# Patient Record
Sex: MALE | Race: WHITE
[De-identification: names, ages, dates, MRNs, and addresses within clinical notes are randomized per-mention and may not be internally consistent; named-entity substitution may affect disease eponyms.]

---

## 2017-05-19 ENCOUNTER — HOSPITAL ENCOUNTER (EMERGENCY)
Dept: HOSPITAL 75 - ER | Age: 27
Discharge: HOME | End: 2017-05-19
Payer: SELF-PAY

## 2017-05-19 VITALS — WEIGHT: 225 LBS | BODY MASS INDEX: 34.1 KG/M2 | HEIGHT: 68 IN

## 2017-05-19 VITALS — DIASTOLIC BLOOD PRESSURE: 78 MMHG | SYSTOLIC BLOOD PRESSURE: 111 MMHG

## 2017-05-19 DIAGNOSIS — F31.9: ICD-10-CM

## 2017-05-19 DIAGNOSIS — F19.10: ICD-10-CM

## 2017-05-19 DIAGNOSIS — Z79.899: ICD-10-CM

## 2017-05-19 DIAGNOSIS — R74.8: Primary | ICD-10-CM

## 2017-05-19 DIAGNOSIS — F20.9: ICD-10-CM

## 2017-05-19 DIAGNOSIS — Z59.0: ICD-10-CM

## 2017-05-19 DIAGNOSIS — Z91.19: ICD-10-CM

## 2017-05-19 LAB
ALBUMIN SERPL-MCNC: 4.3 G/DL (ref 3.2–4.5)
ALT SERPL-CCNC: 293 U/L (ref 0–55)
ANION GAP SERPL CALC-SCNC: 12 MMOL/L (ref 5–14)
APAP SERPL-MCNC: < 10 UG/ML (ref 10–30)
AST SERPL-CCNC: 122 U/L (ref 5–34)
BASOPHILS # BLD AUTO: 0 10^3/UL (ref 0–0.1)
BASOPHILS NFR BLD AUTO: 0 % (ref 0–10)
BILIRUB SERPL-MCNC: 1.3 MG/DL (ref 0.1–1)
BILIRUB UR QL STRIP: NEGATIVE
BUN SERPL-MCNC: 14 MG/DL (ref 7–18)
BUN/CREAT SERPL: 17
CALCIUM SERPL-MCNC: 9.5 MG/DL (ref 8.5–10.1)
CHLORIDE SERPL-SCNC: 108 MMOL/L (ref 98–107)
CO2 SERPL-SCNC: 22 MMOL/L (ref 21–32)
CREAT SERPL-MCNC: 0.84 MG/DL (ref 0.6–1.3)
EOSINOPHIL # BLD AUTO: 0 10^3/UL (ref 0–0.3)
EOSINOPHIL NFR BLD AUTO: 1 % (ref 0–10)
ERYTHROCYTE [DISTWIDTH] IN BLOOD BY AUTOMATED COUNT: 13 % (ref 10–14.5)
ETHANOL SERPL-MCNC: 38 MG/DL (ref ?–10)
GFR SERPLBLD BASED ON 1.73 SQ M-ARVRAT: > 60 ML/MIN
GLUCOSE SERPL-MCNC: 99 MG/DL (ref 70–105)
KETONES UR QL STRIP: (no result)
LEUKOCYTE ESTERASE UR QL STRIP: NEGATIVE
LYMPHOCYTES # BLD AUTO: 1.3 X 10^3 (ref 1–4)
LYMPHOCYTES NFR BLD AUTO: 18 % (ref 12–44)
MCH RBC QN AUTO: 31 PG (ref 25–34)
MCHC RBC AUTO-ENTMCNC: 35 G/DL (ref 32–36)
MCV RBC AUTO: 88 FL (ref 80–99)
MONOCYTES # BLD AUTO: 0.9 X 10^3 (ref 0–1)
MONOCYTES NFR BLD AUTO: 12 % (ref 0–12)
NEUTROPHILS # BLD AUTO: 5.2 X 10^3 (ref 1.8–7.8)
NEUTROPHILS NFR BLD AUTO: 70 % (ref 42–75)
NITRITE UR QL STRIP: NEGATIVE
PH UR STRIP: 5 [PH] (ref 5–9)
PLATELET # BLD: 202 10^3/UL (ref 130–400)
PMV BLD AUTO: 8.9 FL (ref 7.4–10.4)
POTASSIUM SERPL-SCNC: 3.5 MMOL/L (ref 3.6–5)
PROT SERPL-MCNC: 6.5 G/DL (ref 6.4–8.2)
PROT UR QL STRIP: (no result)
RBC # BLD AUTO: 4.51 10^6/UL (ref 4.35–5.85)
SALICYLATES SERPL-MCNC: < 5 MG/DL (ref 5–20)
SODIUM SERPL-SCNC: 142 MMOL/L (ref 135–145)
SP GR UR STRIP: 1.02 (ref 1.02–1.02)
UROBILINOGEN UR-MCNC: 4 MG/DL
WBC # BLD AUTO: 7.5 10^3/UL (ref 4.3–11)
WBC #/AREA URNS HPF: (no result) /HPF

## 2017-05-19 PROCEDURE — 81000 URINALYSIS NONAUTO W/SCOPE: CPT

## 2017-05-19 PROCEDURE — 80053 COMPREHEN METABOLIC PANEL: CPT

## 2017-05-19 PROCEDURE — 93005 ELECTROCARDIOGRAM TRACING: CPT

## 2017-05-19 PROCEDURE — 70450 CT HEAD/BRAIN W/O DYE: CPT

## 2017-05-19 PROCEDURE — 36415 COLL VENOUS BLD VENIPUNCTURE: CPT

## 2017-05-19 PROCEDURE — 80329 ANALGESICS NON-OPIOID 1 OR 2: CPT

## 2017-05-19 PROCEDURE — 85025 COMPLETE CBC W/AUTO DIFF WBC: CPT

## 2017-05-19 PROCEDURE — 72125 CT NECK SPINE W/O DYE: CPT

## 2017-05-19 PROCEDURE — 80306 DRUG TEST PRSMV INSTRMNT: CPT

## 2017-05-19 PROCEDURE — 80320 DRUG SCREEN QUANTALCOHOLS: CPT

## 2017-05-19 PROCEDURE — 70486 CT MAXILLOFACIAL W/O DYE: CPT

## 2017-05-19 SDOH — ECONOMIC STABILITY - HOUSING INSECURITY: HOMELESSNESS: Z59.0

## 2017-05-19 NOTE — ED GENERAL
General


Stated Complaint:  ETOH


Source of Information:  EMS


Exam Limitations:  No Limitations





History of Present Illness


Time Seen by Provider:  21:25


Initial Comments


To ER per EMS after [place] police called them as the patient was found 

meandering down the [facility] streets of [place] wearing only his underwear and 

with a bloody nose.  Patient reported that he been drinking 4 beers tonight and 

several shots of vodka and was looking for night crawlers in the rain when he 

tripped and fell striking his nose.  He also reports that he was recently 

[facility] where he was held for several days 

for drug  charges.  Patient states that tonight he was walking down the street 

because he has a "broken heart because he has So much love that he wants to 

show but doesn't know how to show it" and he was also looking for night collars 

in the rainsso that he  could go fishing.  He denies any thoughts of self-harm.

  He states that he's been on several psychiatric medications but stopped them 

yesterday when he was released from nursing home.  Reports a history of various mental 

illnesses such as PTSD, paranoid schizophrenia, bipolar disorder.  His 

medications included omeprazole, Effexor, Zyprexa, several others that he 

states totaled 7 medications.  He also states that he is homeless and has 

nowhere to go.


Timing/Duration:  Other


Severity:  Moderate (unknown)


Associated Systoms:  Denies Symptoms





Allergies and Home Medications


Allergies


Coded Allergies:  


     No Known Drug Allergies (Unverified , 5/19/17)





Home Medications


Divalproex Sodium 250 Mg Tablet.dr, 250 MG PO BID, #60 Ref 0


   Prescribed by: MADELYN FUENTES on 9/25/15 1236





Constitutional:  see HPI


EENTM:  see HPI


Respiratory:  no symptoms reported


Genitourinary:  no symptoms reported


Musculoskeletal:  no symptoms reported


Skin:  no symptoms reported


Psychiatric/Neurological:  No Symptoms Reported


Hematologic/Lymphatic:  No Symptoms Reported


Immunological/Allergic:  no symptoms reported





Past Medical-Social-Family Hx


Patient Social History


Type Used:  Cigarettes


Recent Hopitalizations:  No





Seasonal Allergies


Seasonal Allergies:  No





Surgeries


HX Surgeries:  Yes


Surgeries:  Adenoidectomy, Tonsillectomy





Respiratory


Hx Respiratory Disorders:  Yes


Respiratory Disorders:  Asthma, COPD





Cardiovascular


Hx Cardiac Disorders:  Yes


Cardiac Disorders:  Heart Murmur





Neurological


Hx Neurological Disorders:  Yes


Neurological Disorders:  Seizure Disorder





Genitourinary


Hx Genitourinary Disorders:  No





Gastrointestinal


Hx Gastrointestinal Disorders:  No





Musculoskeletal


Hx Musculoskeletal Disorders:  No





Endocrine


Hx Endocrine Disorders:  No





HEENT


HX ENT Disorders:  No





Cancer


Hx Cancer:  No





Psychosocial


Hx Psychiatric Problems:  Yes


Behavioral Health Disorders:  Anxiety, Bipolar, Schizophrenia, Depression





Integumentary


HX Skin/Integumentary Disorder:  Yes


Skin/Integumentary Disorders:  Recent Skin Changes





Blood Transfusions


Hx Blood Disorders:  No


Adverse Reaction to a Blood Tr:  No





Family Medical History


Significant Family History:  No Pertinent Family Hx





Physical Exam


Vital Signs





Vital Sign - Last 12Hours








 5/19/17





 21:42


 


Temp 97.6


 


Pulse 86


 


Resp 14


 


B/P (MAP) 126/82


 


Pulse Ox 98


 


O2 Delivery Room Air





Capillary Refill :


General Appearance:  No Apparent Distress, WD/WN


Eyes:  Bilateral Eye EOMI, Bilateral Eye Normal Inspection, Bilateral Eye PERRL


HEENT:  PERRL/EOMI, TMs Normal, Other (edentulous, dried blood in nare, no 

septal hematoma or active bleeding. )


Neck:  Full Range of Motion, Normal Inspection


Respiratory:  Normal Breath Sounds, No Accessory Muscle Use, No Respiratory 

Distress


Cardiovascular:  Regular Rate, Rhythm, Normal Peripheral Pulses


Gastrointestinal:  Non Tender, Soft


Extremity:  Normal Capillary Refill, Normal Inspection


Neurologic/Psychiatric:  Alert, Other (cooperative, somber, answers questions 

appropriately.)


Skin:  Normal Color, Warm/Dry





Progress/Results/Core Measures


Results/Orders


Lab Results





Laboratory Tests








Test


  5/19/17


21:28 5/19/17


21:40 Range/Units


 


 


White Blood Count


  7.5 


  


  4.3-11.0


10^3/uL


 


Red Blood Count


  4.51 


  


  4.35-5.85


10^6/uL


 


Hemoglobin 13.9   13.3-17.7  G/DL


 


Hematocrit 40   40-54  %


 


Mean Corpuscular Volume 88   80-99  FL


 


Mean Corpuscular Hemoglobin 31   25-34  PG


 


Mean Corpuscular Hemoglobin


Concent 35 


  


  32-36  G/DL


 


 


Red Cell Distribution Width 13.0   10.0-14.5  %


 


Platelet Count


  202 


  


  130-400


10^3/uL


 


Mean Platelet Volume 8.9   7.4-10.4  FL


 


Neutrophils (%) (Auto) 70   42-75  %


 


Lymphocytes (%) (Auto) 18   12-44  %


 


Monocytes (%) (Auto) 12   0-12  %


 


Eosinophils (%) (Auto) 1   0-10  %


 


Basophils (%) (Auto) 0   0-10  %


 


Neutrophils # (Auto) 5.2   1.8-7.8  X 10^3


 


Lymphocytes # (Auto) 1.3   1.0-4.0  X 10^3


 


Monocytes # (Auto) 0.9   0.0-1.0  X 10^3


 


Eosinophils # (Auto)


  0.0 


  


  0.0-0.3


10^3/uL


 


Basophils # (Auto)


  0.0 


  


  0.0-0.1


10^3/uL


 


Sodium Level 142   135-145  MMOL/L


 


Potassium Level 3.5 L  3.6-5.0  MMOL/L


 


Chloride Level 108 H    MMOL/L


 


Carbon Dioxide Level 22   21-32  MMOL/L


 


Anion Gap 12   5-14  MMOL/L


 


Blood Urea Nitrogen 14   7-18  MG/DL


 


Creatinine


  0.84 


  


  0.60-1.30


MG/DL


 


Estimat Glomerular Filtration


Rate > 60 


  


   


 


 


BUN/Creatinine Ratio 17    


 


Glucose Level 99     MG/DL


 


Calcium Level 9.5   8.5-10.1  MG/DL


 


Total Bilirubin 1.3 H  0.1-1.0  MG/DL


 


Aspartate Amino Transf


(AST/SGOT) 122 H


  


  5-34  U/L


 


 


Alanine Aminotransferase


(ALT/SGPT) 293 H


  


  0-55  U/L


 


 


Alkaline Phosphatase 87     U/L


 


Total Protein 6.5   6.4-8.2  G/DL


 


Albumin 4.3   3.2-4.5  G/DL


 


Salicylates Level < 5.0 L  5.0-20.0  MG/DL


 


Acetaminophen Level < 10 L  10-30  UG/ML


 


Serum Alcohol 38 H  <10  MG/DL


 


Urine Color  YELLOW   


 


Urine Clarity  CLEAR   


 


Urine pH  5  5-9  


 


Urine Specific Gravity  1.025 H 1.016-1.022  


 


Urine Protein  2+ H NEGATIVE  


 


Urine Glucose (UA)  NEGATIVE  NEGATIVE  


 


Urine Ketones  2+ H NEGATIVE  


 


Urine Nitrite  NEGATIVE  NEGATIVE  


 


Urine Bilirubin  NEGATIVE  NEGATIVE  


 


Urine Urobilinogen  4 H NORMAL  MG/DL


 


Urine Leukocyte Esterase  NEGATIVE  NEGATIVE  


 


Urine RBC (Auto)  NEGATIVE  NEGATIVE  


 


Urine RBC  NONE   /HPF


 


Urine WBC  RARE   /HPF


 


Urine Crystals  NONE   /LPF


 


Urine Bacteria  NEGATIVE   /HPF


 


Urine Casts  NONE   /LPF


 


Urine Mucus  NEGATIVE   /LPF


 


Urine Culture Indicated  NO   








My Orders





Orders - CATRACHITO VELASQUEZ APRN


Cbc With Automated Diff (5/19/17 21:23)


Comprehensive Metabolic Panel (5/19/17 21:23)


Ua Culture If Indicated (5/19/17 21:23)


Drug Screen Stat (Urine) (5/19/17 21:23)


Alcohol (5/19/17 21:23)


Salicylate (5/19/17 21:23)


Acetaminophen (5/19/17 21:23)


Ekg Tracing (5/19/17 21:23)


Ct Head/Face/Cervical Wo (5/19/17 21:23)


Ns Iv 1000 Ml (Sodium Chloride 0.9%) (5/19/17 22:15)





Vital Signs/I&O





Vital Sign - Last 12Hours








 5/19/17





 21:42


 


Temp 97.6


 


Pulse 86


 


Resp 14


 


B/P (MAP) 126/82


 


Pulse Ox 98


 


O2 Delivery Room Air











Departure


Communication


Progress Notes


I called the patient's listed next of kin Binu who states he will not come get 

the patient because the patient has a drug problem and he doesn't want Ti in 

his house while he is not there.  His other contact Day has a disconnected 

phone number.


[id number]-patient has remained alert, sitting upright in bed, pleasant and talkative 

during his ER stay.  Conversation is appropriate.  No suicidal or homicidal 

statements.





Impression


Impression:  


 Primary Impression:  


 Elevated liver enzymes


 Additional Impression:  


 Substance abuse


Disposition:  01 HOME, SELF-CARE


Condition:  Stable





Departure-Patient Inst.


Decision time for Depature:  22:07


Referrals:  


BOB TRAVIS,LOCAL PHYSICIAN (PCP)


Primary Care Physician


Patient Instructions:  ALCOHOL AND SUBSTANCE ABUSE





Add. Discharge Instructions:  


1.  You should follow-up with Dr. Travis or one of her partners at [facility] to further evaluate the cause of your elevated liver enzymes.  

Return to ER for any concerns











CATRACHITO VELASQUEZ May 19, 2017 21:30

## 2017-05-19 NOTE — DIAGNOSTIC IMAGING REPORT
Clinical indication: Patient with bloody nose. EtOH. No history.



Exam: Axial Head CT without IV contrast. Axial Maxillofacial CT

scan without IV contrast with sagittal and coronal reformations.

Axial CT scan of the cervical spine with sagittal and coronal

reformations.



Comparison: CT scan of the head, face and cervical spine dated

8/6/2011.



Findings:



Head CT: There is no evidence of acute cerebral infarct,

intracranial hemorrhage, or gross mass effect. 



There is normal gray-white matter distinction. The brain

parenchymal volume appears appropriate for patient's age. There

is no significant midline shift or herniation. 



There is no evidence of hydrocephalus. The basal cisterns are

unremarkable. 



Skull and maxillofacial CT: There is motion artifact which limits

evaluation of the maxillofacial CT scan. There is no gross

maxillofacial or skull fracture. The skull, extracranial soft

tissue, and orbits are unremarkable. There is minimal mucosal

thickening involving both maxillary sinuses and ethmoid sinus.



Cervical spine CT: There is no evidence of acute cervical spine

fracture or dislocation. There is a small chronic calcification

seen anterior to the C4-C5 intervertebral level. There are small

anterior spurs at the C4-C5 level. The vertebral disc heights are

within normal limits. There is no significant central spinal

canal or neural foramen narrowing. There is no prevertebral soft

tissue swelling. Lung apices are unremarkable. The neck soft

tissue structures show no significant abnormality.



Impression:

1: There is no evidence of acute intracranial process.

2: There is no acute skull or maxillofacial fracture.

3: Mild cervical spine degenerative disease with no acute

fracture or dislocation. 



Dictated by: 



  Dictated on workstation # [id number]

## 2017-06-10 ENCOUNTER — HOSPITAL ENCOUNTER (EMERGENCY)
Dept: HOSPITAL 75 - ER | Age: 27
LOS: 1 days | Discharge: PSYCHIATRIC OR SUBSTANCE USE FACILITY | End: 2017-06-11
Payer: SELF-PAY

## 2017-06-10 VITALS — BODY MASS INDEX: 28.91 KG/M2 | WEIGHT: 195.19 LBS | HEIGHT: 69 IN

## 2017-06-10 DIAGNOSIS — R45.851: Primary | ICD-10-CM

## 2017-06-10 DIAGNOSIS — F12.10: ICD-10-CM

## 2017-06-10 DIAGNOSIS — F17.210: ICD-10-CM

## 2017-06-10 DIAGNOSIS — J45.909: ICD-10-CM

## 2017-06-10 DIAGNOSIS — R74.0: ICD-10-CM

## 2017-06-10 DIAGNOSIS — F15.10: ICD-10-CM

## 2017-06-10 DIAGNOSIS — G40.909: ICD-10-CM

## 2017-06-10 LAB
ALBUMIN SERPL-MCNC: 4 G/DL (ref 3.2–4.5)
ALT SERPL-CCNC: 533 U/L (ref 0–55)
ANION GAP SERPL CALC-SCNC: 12 MMOL/L (ref 5–14)
APAP SERPL-MCNC: < 10 UG/ML (ref 10–30)
AST SERPL-CCNC: 313 U/L (ref 5–34)
BASOPHILS # BLD AUTO: 0.1 10^3/UL (ref 0–0.1)
BASOPHILS NFR BLD AUTO: 1 % (ref 0–10)
BILIRUB SERPL-MCNC: 0.5 MG/DL (ref 0.1–1)
BUN SERPL-MCNC: 11 MG/DL (ref 7–18)
BUN/CREAT SERPL: 14
CALCIUM SERPL-MCNC: 9 MG/DL (ref 8.5–10.1)
CHLORIDE SERPL-SCNC: 110 MMOL/L (ref 98–107)
CO2 SERPL-SCNC: 21 MMOL/L (ref 21–32)
CREAT SERPL-MCNC: 0.76 MG/DL (ref 0.6–1.3)
EOSINOPHIL # BLD AUTO: 0.1 10^3/UL (ref 0–0.3)
EOSINOPHIL NFR BLD AUTO: 2 % (ref 0–10)
ERYTHROCYTE [DISTWIDTH] IN BLOOD BY AUTOMATED COUNT: 14.5 % (ref 10–14.5)
ETHANOL SERPL-MCNC: 103 MG/DL (ref ?–10)
GFR SERPLBLD BASED ON 1.73 SQ M-ARVRAT: > 60 ML/MIN
GLUCOSE SERPL-MCNC: 107 MG/DL (ref 70–105)
LYMPHOCYTES # BLD AUTO: 1.7 X 10^3 (ref 1–4)
LYMPHOCYTES NFR BLD AUTO: 29 % (ref 12–44)
MCH RBC QN AUTO: 31 PG (ref 25–34)
MCHC RBC AUTO-ENTMCNC: 33 G/DL (ref 32–36)
MCV RBC AUTO: 94 FL (ref 80–99)
MONOCYTES # BLD AUTO: 0.8 X 10^3 (ref 0–1)
MONOCYTES NFR BLD AUTO: 13 % (ref 0–12)
NEUTROPHILS # BLD AUTO: 3.4 X 10^3 (ref 1.8–7.8)
NEUTROPHILS NFR BLD AUTO: 55 % (ref 42–75)
PLATELET # BLD: 237 10^3/UL (ref 130–400)
PMV BLD AUTO: 8.7 FL (ref 7.4–10.4)
POTASSIUM SERPL-SCNC: 4 MMOL/L (ref 3.6–5)
PROT SERPL-MCNC: 6.8 G/DL (ref 6.4–8.2)
RBC # BLD AUTO: 4.66 10^6/UL (ref 4.35–5.85)
SALICYLATES SERPL-MCNC: < 5 MG/DL (ref 5–20)
SODIUM SERPL-SCNC: 143 MMOL/L (ref 135–145)
WBC # BLD AUTO: 6.1 10^3/UL (ref 4.3–11)

## 2017-06-10 PROCEDURE — 80329 ANALGESICS NON-OPIOID 1 OR 2: CPT

## 2017-06-10 PROCEDURE — 36415 COLL VENOUS BLD VENIPUNCTURE: CPT

## 2017-06-10 PROCEDURE — 85025 COMPLETE CBC W/AUTO DIFF WBC: CPT

## 2017-06-10 PROCEDURE — 80306 DRUG TEST PRSMV INSTRMNT: CPT

## 2017-06-10 PROCEDURE — 80320 DRUG SCREEN QUANTALCOHOLS: CPT

## 2017-06-10 PROCEDURE — 81000 URINALYSIS NONAUTO W/SCOPE: CPT

## 2017-06-10 PROCEDURE — 93005 ELECTROCARDIOGRAM TRACING: CPT

## 2017-06-10 PROCEDURE — 80053 COMPREHEN METABOLIC PANEL: CPT
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
## 2017-06-11 VITALS — DIASTOLIC BLOOD PRESSURE: 88 MMHG | SYSTOLIC BLOOD PRESSURE: 139 MMHG

## 2017-06-11 LAB
BILIRUB UR QL STRIP: NEGATIVE
KETONES UR QL STRIP: NEGATIVE
LEUKOCYTE ESTERASE UR QL STRIP: NEGATIVE
NITRITE UR QL STRIP: NEGATIVE
PH UR STRIP: 5 [PH] (ref 5–9)
PROT UR QL STRIP: NEGATIVE
SP GR UR STRIP: 1.02 (ref 1.02–1.02)
SQUAMOUS #/AREA URNS HPF: (no result) /HPF
UROBILINOGEN UR-MCNC: NORMAL MG/DL
WBC #/AREA URNS HPF: (no result) /HPF

## 2022-07-01 ENCOUNTER — HOSPITAL ENCOUNTER (EMERGENCY)
Dept: HOSPITAL 75 - ER | Age: 32
Discharge: HOME | End: 2022-07-01
Payer: SELF-PAY

## 2022-07-01 VITALS — HEIGHT: 70 IN | BODY MASS INDEX: 30.11 KG/M2 | WEIGHT: 210.32 LBS

## 2022-07-01 VITALS — SYSTOLIC BLOOD PRESSURE: 120 MMHG | DIASTOLIC BLOOD PRESSURE: 89 MMHG

## 2022-07-01 DIAGNOSIS — M79.671: ICD-10-CM
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
## 2022-07-01 DIAGNOSIS — F15.10: ICD-10-CM

## 2022-07-01 DIAGNOSIS — F17.210: ICD-10-CM

## 2022-07-01 DIAGNOSIS — S90.421A: Primary | ICD-10-CM

## 2022-07-01 DIAGNOSIS — G89.29: ICD-10-CM

## 2022-07-01 DIAGNOSIS — Z28.310: ICD-10-CM

## 2022-07-01 DIAGNOSIS — M79.672: ICD-10-CM

## 2022-07-01 PROCEDURE — 99281 EMR DPT VST MAYX REQ PHY/QHP: CPT

## 2022-07-01 NOTE — ED LOWER EXTREMITY
General


Stated Complaint:  STS FEET FEEL NUMB SINCE JAN 2021,[address]


Source:  patient (DIFFICULT HISTORIAN, SPEECH IS ERRATIC, AND GIVES CONVOLUTED 

AND INCONSISTENT INFORMATION, APPEARS TO BE UNDER THE INFLUENCE OF SOME 

SUBSTANCE/S. )





History of Present Illness


Date Seen by Provider:  Jul 1, 2022


Time Seen by Provider:  05:00


Initial Comments


PT ARRIVES ON OWN--PT IS HOMELESS AND WALKED HERE


C/O BILATERAL FOOT "NUMBNESS" SINCE "FEBRUARY OF 2021" 


THEN ALSO STATES THAT HIS FEET HURT AND SWELL ALL THE TIME


NO INJURY TO FEET





PT STATES HE JUST GOT OUT OF snf IN [place] 2 NIGHTS AGO, AND HAS BEEN IN AND 

OUT OF snf FOR MOST OF THE LAST YEAR


PT STATES HE HAS BEEN SEEN "A FEW TIMES" BY THE DR WHO COMES TO THE snf AND HAS

BEEN TOLD THAT HE HAS NEUROPATHY, AND STATES "THEY SAID ONCE I GOT OUT OF snf 

THEY COULD GIVE ME SOME MEDICINE" 


PT STATES HE IS HOMELESS AND WALKED HERE TO [place] FROM [place]. 


PT IS WEARING THIN NYLON SOCKS AND PLASTIC/RUBBER-TYPE SANDALS. 





SYMPTOMS ARE NO DIFFERENT TONIGHT IN ANY WAY


STATES HE TAKES ADVIL FOR THE PAIN





PT STATES  HE USES IV METH, AND HE LAST USED "AFTER I GOT OUT OF snf" --IN THE 

LAST 1-2 DAYS





PT STATES HE HAS HAD A DVT IN HIS LEFT LEG IN 2015 WHILE HE WAS IN [place]--PT 

IS NOT ON ANY ASPIRIN OR BLOOD THINNERS


PT STATES HE IS NOT DIABETIC AND DOES NOT HAVE HIGH BLOOD PRESSURE OR HEART 

PROBLEMS





STATES HE DOES NOT TAKE ANY PRESCRIPTION MEDICATION FOR ANYTHING.





PCP: PAULIE-K





Allergies and Home Medications


Allergies


Coded Allergies:  


     No Known Drug Allergies (Unverified , 5/19/17)





Patient Home Medication List


Home Medication List Reviewed:  Yes


Divalproex Sodium (Depakote) 250 Mg Tablet., 250 MG PO BID


   Prescribed by: MADELYN FUENTES on 9/25/15 1236





Review of Systems


Constitutional:  no symptoms reported


Musculoskeletal:  see HPI


Skin:  other (HAS A BLISTER ON RIGHT GREAT TOE)


Psychiatric/Neurological:  See HPI





Past Medical-Social-Family Hx


Patient Social History


Tobacco Use?:  Yes


Tobacco type used:  Cigarettes


Smoking Status:  Current Everyday Smoker


Substance use?:  Yes


Substance type:  Methamphetamine, Marijuana


Additional substance use comme:  + IV METH, THC USE


Alcohol Use?:  Yes (HX OF ABUSE, CLAIMS NO RECENT USE)





Seasonal Allergies


Seasonal Allergies:  No





Past Medical History


Surgeries:  Yes


Adenoidectomy, Tonsillectomy


Respiratory:  Yes


Asthma, COPD


Cardiac:  Yes


Heart Murmur


Neurological:  Yes (SUSPECTED NEUROPATHY IN FEET)


Seizure Disorder


Genitourinary:  No


Gastrointestinal:  No


Musculoskeletal:  No


Endocrine:  No


Cancer:  No


Psychosocial:  Yes (POLYSUBSTANCE ABUSE)


Anxiety, Suicide Attempts, Depression


Integumentary:  No


Blood Disorders:  No


Adverse Reaction/Blood Tranf:  No





Family Medical History


No Pertinent Family Hx











MULTIPLE INCARCERATIONS


PT IS HOMELESS





Physical Exam


Vital Signs


Capillary Refill :


Height, Weight, BMI


Height: 5'9.00"


Weight: 195lbs. 3.0oz. 88.894218mh; 25.82 BMI


Method:Stated


General Appearance:  WD/WN, no apparent distress, other (DIRTY, MALODOROUS. 

CONSTANT MOVEMENTS OF ENTIRE BODY AND MOUTH, WITH CONSTANT TONGUE 

MOVEMENTS--CONSTANTLY STICKING OUT TONGUE. PT WALKS IN ON HIS OWN. )


Cardiovascular:  normal peripheral pulses, regular rate, rhythm, no murmur


Respiratory:  normal breath sounds


Feet:  bilateral foot other (JERKS DRAMATICALLY TO LIGHT TOUCH TO BOTH FEET. NO 

SWELLING TO FEET. NO DISCOLORATION. FEET PINK/WARM WITH GOOD CAPILLARY REFILL. 

PULSES +3/4 BILATERALLY. THERE IS A RUPTURED BLISTER ON RIGHT GREAT TOE, BUT NO 

DRAINARE OR SIGNS OF INFECTION. FULL ROM OF FEET AND LEGS. )


Neurologic/Tendon:  normal sensation, normal motor functions, normal tendon 

functions


Neurologic/Psychiatric:  no motor/sensory deficits, alert, oriented x 3, other 

(BEHAVIOR AS NOTED ABOVE. SPEECH IS ERRATIC AND SOMEWHAT TANGENTIAL, AND GIVES 

INCONSISTENT AND CONVOLUTED INFORMATION. )


Skin:  normal color, warm/dry





Departure


Impression





   Primary Impression:  


   Chronic pain of both feet


   Additional Impressions:  


   CHRONIC BILATERAL FOOT PAIN 


   Methamphetamine use


Disposition:  01 HOME, SELF-CARE


Condition:  Stable





Departure-Patient Inst.


Decision time for Depature:  05:10


Referrals:  


[facility] (PCP/Family)


Primary Care Physician


Patient Instructions:  CHRONIC PAIN, Methamphetamine





Add. Discharge Instructions:  


FOLLOW UP WITH [facility] THIS WEEK FOR FURTHER CARE--YOU MAY GO TO ONE OF THEIR 

WALK IN CLINICS ANY DAY OF THE WEEK.











MICHAEL ROSA DO                  Jul 1, 2022 05:11